# Patient Record
Sex: FEMALE | Race: OTHER | URBAN - METROPOLITAN AREA
[De-identification: names, ages, dates, MRNs, and addresses within clinical notes are randomized per-mention and may not be internally consistent; named-entity substitution may affect disease eponyms.]

---

## 2018-06-01 ENCOUNTER — INPATIENT (INPATIENT)
Facility: HOSPITAL | Age: 83
LOS: 0 days | Discharge: HOME HEALTH | DRG: 388 | End: 2018-06-02
Attending: INTERNAL MEDICINE | Admitting: INTERNAL MEDICINE
Payer: COMMERCIAL

## 2018-06-01 VITALS
HEIGHT: 63 IN | WEIGHT: 186.07 LBS | DIASTOLIC BLOOD PRESSURE: 75 MMHG | HEART RATE: 93 BPM | RESPIRATION RATE: 16 BRPM | TEMPERATURE: 99 F | SYSTOLIC BLOOD PRESSURE: 123 MMHG | OXYGEN SATURATION: 95 %

## 2018-06-01 LAB
ALBUMIN SERPL ELPH-MCNC: 4.1 G/DL — SIGNIFICANT CHANGE UP (ref 3.3–5)
ALP SERPL-CCNC: 90 U/L — SIGNIFICANT CHANGE UP (ref 40–120)
ALT FLD-CCNC: 6 U/L — LOW (ref 10–45)
ANION GAP SERPL CALC-SCNC: 14 MMOL/L — SIGNIFICANT CHANGE UP (ref 5–17)
APTT BLD: 27.8 SEC — SIGNIFICANT CHANGE UP (ref 27.5–37.4)
AST SERPL-CCNC: 24 U/L — SIGNIFICANT CHANGE UP (ref 10–40)
BASOPHILS NFR BLD AUTO: 0.2 % — SIGNIFICANT CHANGE UP (ref 0–2)
BILIRUB SERPL-MCNC: 0.6 MG/DL — SIGNIFICANT CHANGE UP (ref 0.2–1.2)
BUN SERPL-MCNC: 33 MG/DL — HIGH (ref 7–23)
CALCIUM SERPL-MCNC: 9.4 MG/DL — SIGNIFICANT CHANGE UP (ref 8.4–10.5)
CHLORIDE SERPL-SCNC: 103 MMOL/L — SIGNIFICANT CHANGE UP (ref 96–108)
CO2 SERPL-SCNC: 26 MMOL/L — SIGNIFICANT CHANGE UP (ref 22–31)
CREAT SERPL-MCNC: 0.7 MG/DL — SIGNIFICANT CHANGE UP (ref 0.5–1.3)
EOSINOPHIL NFR BLD AUTO: 0.1 % — SIGNIFICANT CHANGE UP (ref 0–6)
GLUCOSE SERPL-MCNC: 105 MG/DL — HIGH (ref 70–99)
HCT VFR BLD CALC: 42.4 % — SIGNIFICANT CHANGE UP (ref 34.5–45)
HGB BLD-MCNC: 13.6 G/DL — SIGNIFICANT CHANGE UP (ref 11.5–15.5)
INR BLD: 1.11 — SIGNIFICANT CHANGE UP (ref 0.88–1.16)
LACTATE SERPL-SCNC: 1.2 MMOL/L — SIGNIFICANT CHANGE UP (ref 0.5–2)
LYMPHOCYTES # BLD AUTO: 11.2 % — LOW (ref 13–44)
MAGNESIUM SERPL-MCNC: 2.2 MG/DL — SIGNIFICANT CHANGE UP (ref 1.6–2.6)
MCHC RBC-ENTMCNC: 29.1 PG — SIGNIFICANT CHANGE UP (ref 27–34)
MCHC RBC-ENTMCNC: 32.1 G/DL — SIGNIFICANT CHANGE UP (ref 32–36)
MCV RBC AUTO: 90.6 FL — SIGNIFICANT CHANGE UP (ref 80–100)
MONOCYTES NFR BLD AUTO: 5.8 % — SIGNIFICANT CHANGE UP (ref 2–14)
NEUTROPHILS NFR BLD AUTO: 82.7 % — HIGH (ref 43–77)
PHOSPHATE SERPL-MCNC: 4.4 MG/DL — SIGNIFICANT CHANGE UP (ref 2.5–4.5)
PLATELET # BLD AUTO: 227 K/UL — SIGNIFICANT CHANGE UP (ref 150–400)
POTASSIUM SERPL-MCNC: 4.4 MMOL/L — SIGNIFICANT CHANGE UP (ref 3.5–5.3)
POTASSIUM SERPL-SCNC: 4.4 MMOL/L — SIGNIFICANT CHANGE UP (ref 3.5–5.3)
PROT SERPL-MCNC: 6.6 G/DL — SIGNIFICANT CHANGE UP (ref 6–8.3)
PROTHROM AB SERPL-ACNC: 12.4 SEC — SIGNIFICANT CHANGE UP (ref 9.8–12.7)
RBC # BLD: 4.68 M/UL — SIGNIFICANT CHANGE UP (ref 3.8–5.2)
RBC # FLD: 15.5 % — SIGNIFICANT CHANGE UP (ref 10.3–16.9)
SODIUM SERPL-SCNC: 143 MMOL/L — SIGNIFICANT CHANGE UP (ref 135–145)
WBC # BLD: 16.4 K/UL — HIGH (ref 3.8–10.5)
WBC # FLD AUTO: 16.4 K/UL — HIGH (ref 3.8–10.5)

## 2018-06-01 PROCEDURE — 74019 RADEX ABDOMEN 2 VIEWS: CPT | Mod: 26

## 2018-06-01 PROCEDURE — 71045 X-RAY EXAM CHEST 1 VIEW: CPT | Mod: 26

## 2018-06-01 RX ORDER — SODIUM CHLORIDE 9 MG/ML
1000 INJECTION INTRAMUSCULAR; INTRAVENOUS; SUBCUTANEOUS
Qty: 0 | Refills: 0 | Status: DISCONTINUED | OUTPATIENT
Start: 2018-06-01 | End: 2018-06-02

## 2018-06-01 RX ADMIN — SODIUM CHLORIDE 90 MILLILITER(S): 9 INJECTION INTRAMUSCULAR; INTRAVENOUS; SUBCUTANEOUS at 23:24

## 2018-06-01 NOTE — H&P ADULT - NSHPPHYSICALEXAM_GEN_ALL_CORE
.  VITAL SIGNS:  T(C): 37.1 (06-01-18 @ 21:21), Max: 37.1 (06-01-18 @ 21:21)  T(F): 98.7 (06-01-18 @ 21:21), Max: 98.7 (06-01-18 @ 21:21)  HR: 93 (06-01-18 @ 21:21) (93 - 93)  BP: 123/75 (06-01-18 @ 21:21) (123/75 - 123/75)  BP(mean): --  RR: 16 (06-01-18 @ 21:21) (16 - 16)  SpO2: 95% (06-01-18 @ 21:21) (95% - 95%)  Wt(kg): --    PHYSICAL EXAM:    Constitutional: Frail, resting comfortably in bed; NAD  Head: NC/AT  Eyes: PERRL, EOMI, anicteric sclera  ENT: no nasal discharge; uvula midline, no oropharyngeal erythema or exudates; dry MM  Neck: supple; no JVD or thyromegaly  Respiratory: CTA B/L; no W/R/R, no retractions  Cardiac: +S1/S2; RRR; no M/R/G; PMI non-displaced  Gastrointestinal: soft, NT, distended, no rebound or guarding; +BSx4. Subcutaneous deep brain stimulator in place.  Extremities: WWP, no clubbing or cyanosis; no peripheral edema  Musculoskeletal: NROM x4; no joint swelling, tenderness or erythema  Vascular: 2+ radial, femoral, DP/PT pulses B/L  Dermatologic: skin warm, dry and intact; no rashes, wounds, or scars  Lymphatic: no submandibular or cervical LAD  Neurologic: AAOx2; CNII-XII grossly intact; no focal deficits  Psychiatric: affect and characteristics of appearance, verbalizations, behaviors are appropriate

## 2018-06-01 NOTE — PROGRESS NOTE ADULT - SUBJECTIVE AND OBJECTIVE BOX
Reviewed history with the family  Patient is awake and responsive Not fully oriented VS stable Afeb In RR Good BS No bowel sounds Abd is softly distended with slight RLQ tenderness

## 2018-06-01 NOTE — H&P ADULT - HISTORY OF PRESENT ILLNESS
83M w/hx of Parkinson's (w/an implanted nerve stimulator) who was transferred from [facility] with reported findings of partial gastric outlet obstruction and partial SBO. Per family at bedside, the patient has been having decreased PO intake for the last few weeks. This morning, the patient complained of abdominal pain to her home health aides (HHA) along with nausea and decreased pallor. Patient brought in to [place] ED where she had a CT A/P that showed partial gastric otulet obstruction and air fluid levels consistent with SBO. The staff there were unable to place a NGT; the patient reportedly vomited during attempted placement of the tube. The patient had a bowel movement in the ED before decision made to transfer patient to [facility] under Dr. Delgado. ROS (-) for recent illnesses, chest pain, palpitations, blood in stool, difficulty urinating, lower extremity swelling.     Upon arrival to the floor, VS - T: 98.7, HR: 93, BP: 123/73, R: 16, SpO2: 95% , 3LNC. 83M w/hx of Parkinson's (w/an implanted nerve stimulator) who was transferred from [facility] with reported findings of partial gastric outlet obstruction and partial SBO. Per family at bedside, the patient has been having decreased PO intake for the last few weeks. This morning, the patient complained of abdominal pain to her home health aides (HHA) along with nausea and decreased pallor. Patient brought in to [place] ED where she had a CT A/P that showed partial gastric otulet obstruction and air fluid levels consistent with SBO. The staff there were unable to place a NGT; the patient reportedly vomited during attempted placement of the tube. The patient had a bowel movement in the ED before decision made to transfer patient to [facility] under Dr. Delgado. ROS (-) for recent illnesses, chest pain, palpitations, blood in stool, difficulty urinating, lower extremity swelling.     Upon arrival to the floor, VS - T: 98.7, HR: 93, BP: 123/73, R: 16, SpO2: 95%, 3LNC. 83F w/hx of Parkinson's (w/an implanted nerve stimulator) who was transferred from [facility] with reported findings of partial gastric outlet obstruction and partial SBO. Per family at bedside, the patient has been having decreased PO intake for the last few weeks. This morning, the patient complained of abdominal pain to her home health aides (HHA) along with nausea and decreased pallor. Patient brought in to [place] ED where she had a CT A/P that showed partial gastric otulet obstruction and air fluid levels consistent with SBO. The staff there were unable to place a NGT; the patient reportedly vomited during attempted placement of the tube. The patient had a bowel movement in the ED before decision made to transfer patient to [facility] under Dr. Delgado. ROS (-) for recent illnesses, chest pain, palpitations, blood in stool, difficulty urinating, lower extremity swelling.     Upon arrival to the floor, VS - T: 98.7, HR: 93, BP: 123/73, R: 16, SpO2: 95%, 3LNC.

## 2018-06-01 NOTE — H&P ADULT - NSHPLABSRESULTS_GEN_ALL_CORE
.  LABS:                         13.6   16.4  )-----------( 227      ( 01 Jun 2018 23:06 )             42.4     06-01    143  |  103  |  33<H>  ----------------------------<  105<H>  4.4   |  26  |  0.70    Ca    9.4      01 Jun 2018 23:06  Phos  4.4     06-01  Mg     2.2     06-01    TPro  6.6  /  Alb  4.1  /  TBili  0.6  /  DBili  x   /  AST  24  /  ALT  6<L>  /  AlkPhos  90  06-01    PT/INR - ( 01 Jun 2018 23:06 )   PT: 12.4 sec;   INR: 1.11          PTT - ( 01 Jun 2018 23:06 )  PTT:27.8 sec          Lactate, Blood: 1.2 mmoL/L (06-01 @ 23:04)      RADIOLOGY, EKG & ADDITIONAL TESTS: Reviewed.

## 2018-06-01 NOTE — PROGRESS NOTE ADULT - ASSESSMENT
To treat conservatively tonight Will discuss with surgery  Discussed at length with the patients family who are at the bedside  They understand the critical nature of the problem given her severe comorbidities

## 2018-06-01 NOTE — H&P ADULT - PROBLEM SELECTOR PLAN 1
Patient presenting with abdominal pain, nausea with imaging at [facility] showing gastric outlet obstruction and partial small bowel obstruction. Patient having BMs and reports passing gas.  - NPO  - Surgery consult

## 2018-06-01 NOTE — H&P ADULT - ASSESSMENT
83F w/hx of Parkinson's (w/an implanted nerve stimulator) who was transferred from [facility] with reported findings of partial gastric outlet obstruction and partial SBO.

## 2018-06-02 VITALS
OXYGEN SATURATION: 93 % | RESPIRATION RATE: 18 BRPM | TEMPERATURE: 100 F | HEART RATE: 88 BPM | DIASTOLIC BLOOD PRESSURE: 69 MMHG | SYSTOLIC BLOOD PRESSURE: 122 MMHG

## 2018-06-02 DIAGNOSIS — Z96.89 PRESENCE OF OTHER SPECIFIED FUNCTIONAL IMPLANTS: Chronic | ICD-10-CM

## 2018-06-02 DIAGNOSIS — R63.8 OTHER SYMPTOMS AND SIGNS CONCERNING FOOD AND FLUID INTAKE: ICD-10-CM

## 2018-06-02 DIAGNOSIS — Z29.9 ENCOUNTER FOR PROPHYLACTIC MEASURES, UNSPECIFIED: ICD-10-CM

## 2018-06-02 DIAGNOSIS — G20 PARKINSON'S DISEASE: ICD-10-CM

## 2018-06-02 DIAGNOSIS — K56.609 UNSPECIFIED INTESTINAL OBSTRUCTION, UNSPECIFIED AS TO PARTIAL VERSUS COMPLETE OBSTRUCTION: ICD-10-CM

## 2018-06-02 LAB
ANION GAP SERPL CALC-SCNC: 13 MMOL/L — SIGNIFICANT CHANGE UP (ref 5–17)
ANION GAP SERPL CALC-SCNC: 16 MMOL/L — SIGNIFICANT CHANGE UP (ref 5–17)
BASOPHILS NFR BLD AUTO: 0.3 % — SIGNIFICANT CHANGE UP (ref 0–2)
BASOPHILS NFR BLD AUTO: 0.4 % — SIGNIFICANT CHANGE UP (ref 0–2)
BUN SERPL-MCNC: 26 MG/DL — HIGH (ref 7–23)
BUN SERPL-MCNC: 28 MG/DL — HIGH (ref 7–23)
C DIFF GDH STL QL: NEGATIVE — SIGNIFICANT CHANGE UP
C DIFF GDH STL QL: SIGNIFICANT CHANGE UP
CALCIUM SERPL-MCNC: 8.3 MG/DL — LOW (ref 8.4–10.5)
CALCIUM SERPL-MCNC: 8.4 MG/DL — SIGNIFICANT CHANGE UP (ref 8.4–10.5)
CHLORIDE SERPL-SCNC: 105 MMOL/L — SIGNIFICANT CHANGE UP (ref 96–108)
CHLORIDE SERPL-SCNC: 110 MMOL/L — HIGH (ref 96–108)
CO2 SERPL-SCNC: 20 MMOL/L — LOW (ref 22–31)
CO2 SERPL-SCNC: 23 MMOL/L — SIGNIFICANT CHANGE UP (ref 22–31)
CREAT SERPL-MCNC: 0.42 MG/DL — LOW (ref 0.5–1.3)
CREAT SERPL-MCNC: 0.48 MG/DL — LOW (ref 0.5–1.3)
EOSINOPHIL NFR BLD AUTO: 1.1 % — SIGNIFICANT CHANGE UP (ref 0–6)
EOSINOPHIL NFR BLD AUTO: 1.6 % — SIGNIFICANT CHANGE UP (ref 0–6)
GLUCOSE SERPL-MCNC: 145 MG/DL — HIGH (ref 70–99)
GLUCOSE SERPL-MCNC: 171 MG/DL — HIGH (ref 70–99)
HCT VFR BLD CALC: 37.2 % — SIGNIFICANT CHANGE UP (ref 34.5–45)
HCT VFR BLD CALC: 39.3 % — SIGNIFICANT CHANGE UP (ref 34.5–45)
HGB BLD-MCNC: 11.8 G/DL — SIGNIFICANT CHANGE UP (ref 11.5–15.5)
HGB BLD-MCNC: 12.3 G/DL — SIGNIFICANT CHANGE UP (ref 11.5–15.5)
LYMPHOCYTES # BLD AUTO: 17.8 % — SIGNIFICANT CHANGE UP (ref 13–44)
LYMPHOCYTES # BLD AUTO: 21.5 % — SIGNIFICANT CHANGE UP (ref 13–44)
MAGNESIUM SERPL-MCNC: 1.8 MG/DL — SIGNIFICANT CHANGE UP (ref 1.6–2.6)
MAGNESIUM SERPL-MCNC: 1.9 MG/DL — SIGNIFICANT CHANGE UP (ref 1.6–2.6)
MCHC RBC-ENTMCNC: 28.4 PG — SIGNIFICANT CHANGE UP (ref 27–34)
MCHC RBC-ENTMCNC: 28.7 PG — SIGNIFICANT CHANGE UP (ref 27–34)
MCHC RBC-ENTMCNC: 31.3 G/DL — LOW (ref 32–36)
MCHC RBC-ENTMCNC: 31.7 G/DL — LOW (ref 32–36)
MCV RBC AUTO: 90.5 FL — SIGNIFICANT CHANGE UP (ref 80–100)
MCV RBC AUTO: 90.8 FL — SIGNIFICANT CHANGE UP (ref 80–100)
MONOCYTES NFR BLD AUTO: 3.9 % — SIGNIFICANT CHANGE UP (ref 2–14)
MONOCYTES NFR BLD AUTO: 4.4 % — SIGNIFICANT CHANGE UP (ref 2–14)
NEUTROPHILS NFR BLD AUTO: 73.2 % — SIGNIFICANT CHANGE UP (ref 43–77)
NEUTROPHILS NFR BLD AUTO: 75.8 % — SIGNIFICANT CHANGE UP (ref 43–77)
PLATELET # BLD AUTO: 208 K/UL — SIGNIFICANT CHANGE UP (ref 150–400)
PLATELET # BLD AUTO: 210 K/UL — SIGNIFICANT CHANGE UP (ref 150–400)
POTASSIUM SERPL-MCNC: 3.7 MMOL/L — SIGNIFICANT CHANGE UP (ref 3.5–5.3)
POTASSIUM SERPL-MCNC: 4.1 MMOL/L — SIGNIFICANT CHANGE UP (ref 3.5–5.3)
POTASSIUM SERPL-SCNC: 3.7 MMOL/L — SIGNIFICANT CHANGE UP (ref 3.5–5.3)
POTASSIUM SERPL-SCNC: 4.1 MMOL/L — SIGNIFICANT CHANGE UP (ref 3.5–5.3)
RBC # BLD: 4.11 M/UL — SIGNIFICANT CHANGE UP (ref 3.8–5.2)
RBC # BLD: 4.33 M/UL — SIGNIFICANT CHANGE UP (ref 3.8–5.2)
RBC # FLD: 15 % — SIGNIFICANT CHANGE UP (ref 10.3–16.9)
RBC # FLD: 15.4 % — SIGNIFICANT CHANGE UP (ref 10.3–16.9)
SODIUM SERPL-SCNC: 141 MMOL/L — SIGNIFICANT CHANGE UP (ref 135–145)
SODIUM SERPL-SCNC: 146 MMOL/L — HIGH (ref 135–145)
TSH SERPL-MCNC: 0.63 UIU/ML — SIGNIFICANT CHANGE UP (ref 0.35–4.94)
WBC # BLD: 10.4 K/UL — SIGNIFICANT CHANGE UP (ref 3.8–10.5)
WBC # BLD: 11 K/UL — HIGH (ref 3.8–10.5)
WBC # FLD AUTO: 10.4 K/UL — SIGNIFICANT CHANGE UP (ref 3.8–10.5)
WBC # FLD AUTO: 11 K/UL — HIGH (ref 3.8–10.5)

## 2018-06-02 PROCEDURE — 97161 PT EVAL LOW COMPLEX 20 MIN: CPT

## 2018-06-02 PROCEDURE — 85610 PROTHROMBIN TIME: CPT

## 2018-06-02 PROCEDURE — 80048 BASIC METABOLIC PNL TOTAL CA: CPT

## 2018-06-02 PROCEDURE — 85025 COMPLETE CBC W/AUTO DIFF WBC: CPT

## 2018-06-02 PROCEDURE — 83605 ASSAY OF LACTIC ACID: CPT

## 2018-06-02 PROCEDURE — 85027 COMPLETE CBC AUTOMATED: CPT

## 2018-06-02 PROCEDURE — 83735 ASSAY OF MAGNESIUM: CPT

## 2018-06-02 PROCEDURE — 87324 CLOSTRIDIUM AG IA: CPT

## 2018-06-02 PROCEDURE — 36415 COLL VENOUS BLD VENIPUNCTURE: CPT

## 2018-06-02 PROCEDURE — 85730 THROMBOPLASTIN TIME PARTIAL: CPT

## 2018-06-02 PROCEDURE — 84100 ASSAY OF PHOSPHORUS: CPT

## 2018-06-02 PROCEDURE — 74019 RADEX ABDOMEN 2 VIEWS: CPT

## 2018-06-02 PROCEDURE — 71045 X-RAY EXAM CHEST 1 VIEW: CPT

## 2018-06-02 PROCEDURE — 84443 ASSAY THYROID STIM HORMONE: CPT

## 2018-06-02 PROCEDURE — 80053 COMPREHEN METABOLIC PANEL: CPT

## 2018-06-02 PROCEDURE — 87449 NOS EACH ORGANISM AG IA: CPT

## 2018-06-02 RX ORDER — CARBIDOPA AND LEVODOPA 25; 100 MG/1; MG/1
1.5 TABLET ORAL THREE TIMES A DAY
Qty: 0 | Refills: 0 | Status: DISCONTINUED | OUTPATIENT
Start: 2018-06-02 | End: 2018-06-02

## 2018-06-02 RX ORDER — HEPARIN SODIUM 5000 [USP'U]/ML
5000 INJECTION INTRAVENOUS; SUBCUTANEOUS EVERY 12 HOURS
Qty: 0 | Refills: 0 | Status: DISCONTINUED | OUTPATIENT
Start: 2018-06-02 | End: 2018-06-02

## 2018-06-02 RX ORDER — ESCITALOPRAM OXALATE 10 MG/1
15 TABLET, FILM COATED ORAL DAILY
Qty: 0 | Refills: 0 | Status: DISCONTINUED | OUTPATIENT
Start: 2018-06-02 | End: 2018-06-02

## 2018-06-02 RX ORDER — LANOLIN ALCOHOL/MO/W.PET/CERES
1 CREAM (GRAM) TOPICAL
Qty: 0 | Refills: 0 | COMMUNITY

## 2018-06-02 RX ORDER — DOCUSATE SODIUM 100 MG
1 CAPSULE ORAL
Qty: 0 | Refills: 0 | COMMUNITY

## 2018-06-02 RX ORDER — RIVASTIGMINE 4.6 MG/24H
1 PATCH, EXTENDED RELEASE TRANSDERMAL
Qty: 0 | Refills: 0 | COMMUNITY

## 2018-06-02 RX ORDER — CARBIDOPA AND LEVODOPA 25; 100 MG/1; MG/1
3 TABLET ORAL
Qty: 0 | Refills: 0 | COMMUNITY

## 2018-06-02 RX ORDER — SODIUM CHLORIDE 9 MG/ML
500 INJECTION, SOLUTION INTRAVENOUS
Qty: 0 | Refills: 0 | Status: DISCONTINUED | OUTPATIENT
Start: 2018-06-02 | End: 2018-06-02

## 2018-06-02 RX ORDER — DOCUSATE SODIUM 100 MG
100 CAPSULE ORAL
Qty: 0 | Refills: 0 | Status: DISCONTINUED | OUTPATIENT
Start: 2018-06-02 | End: 2018-06-02

## 2018-06-02 RX ORDER — CARBIDOPA AND LEVODOPA 25; 100 MG/1; MG/1
3 TABLET ORAL
Qty: 0 | Refills: 0 | Status: DISCONTINUED | OUTPATIENT
Start: 2018-06-02 | End: 2018-06-02

## 2018-06-02 RX ORDER — CARBIDOPA AND LEVODOPA 25; 100 MG/1; MG/1
1 TABLET ORAL
Qty: 0 | Refills: 0 | COMMUNITY

## 2018-06-02 RX ORDER — CLOZAPINE 150 MG/1
31.25 TABLET, ORALLY DISINTEGRATING ORAL DAILY
Qty: 0 | Refills: 0 | Status: DISCONTINUED | OUTPATIENT
Start: 2018-06-02 | End: 2018-06-02

## 2018-06-02 RX ORDER — ESCITALOPRAM OXALATE 10 MG/1
15 TABLET, FILM COATED ORAL
Qty: 0 | Refills: 0 | COMMUNITY

## 2018-06-02 RX ORDER — SODIUM CHLORIDE 9 MG/ML
1000 INJECTION, SOLUTION INTRAVENOUS
Qty: 0 | Refills: 0 | Status: DISCONTINUED | OUTPATIENT
Start: 2018-06-02 | End: 2018-06-02

## 2018-06-02 RX ORDER — CLOZAPINE 150 MG/1
31.25 TABLET, ORALLY DISINTEGRATING ORAL
Qty: 0 | Refills: 0 | COMMUNITY

## 2018-06-02 RX ORDER — RIVASTIGMINE 4.6 MG/24H
1 PATCH, EXTENDED RELEASE TRANSDERMAL EVERY 24 HOURS
Qty: 0 | Refills: 0 | Status: DISCONTINUED | OUTPATIENT
Start: 2018-06-02 | End: 2018-06-02

## 2018-06-02 RX ADMIN — HEPARIN SODIUM 5000 UNIT(S): 5000 INJECTION INTRAVENOUS; SUBCUTANEOUS at 16:59

## 2018-06-02 RX ADMIN — SODIUM CHLORIDE 250 MILLILITER(S): 9 INJECTION, SOLUTION INTRAVENOUS at 12:08

## 2018-06-02 RX ADMIN — RIVASTIGMINE 1 PATCH: 4.6 PATCH, EXTENDED RELEASE TRANSDERMAL at 10:24

## 2018-06-02 RX ADMIN — CARBIDOPA AND LEVODOPA 3 CAPSULE(S): 25; 100 TABLET ORAL at 14:43

## 2018-06-02 RX ADMIN — CARBIDOPA AND LEVODOPA 3 CAPSULE(S): 25; 100 TABLET ORAL at 18:34

## 2018-06-02 RX ADMIN — CARBIDOPA AND LEVODOPA 3 CAPSULE(S): 25; 100 TABLET ORAL at 10:54

## 2018-06-02 RX ADMIN — HEPARIN SODIUM 5000 UNIT(S): 5000 INJECTION INTRAVENOUS; SUBCUTANEOUS at 05:59

## 2018-06-02 NOTE — PHYSICAL THERAPY INITIAL EVALUATION ADULT - ADDITIONAL COMMENTS
Pt has ~12 steps to negotiate, is without use of DME as she holds on to HHA. Has hx of falls however unable to obtain information from pt or son regarding situations.

## 2018-06-02 NOTE — DIETITIAN INITIAL EVALUATION ADULT. - ENERGY NEEDS
Height: 5'3" Weight: 84lbs, IBW 115lbs+/-10%, %IBW 73%, BMI 14.9  IBW used for calculations as pt <80% of IBW    Nutrient needs based on [facility] standards of care for maintenance in older adults.  Needs adjusted for age, unintentional wt loss, severe muscle wasting, stage II pressure ulcer

## 2018-06-02 NOTE — DIETITIAN INITIAL EVALUATION ADULT. - PROBLEM SELECTOR PLAN 1
Patient presenting with abdominal pain, nausea with imaging at [facility] showing gastric outlet obstruction and partial small bowel obstruction. Patient having BMs and reports passing gas.  - NPO  - Surgery consult

## 2018-06-02 NOTE — PHYSICAL THERAPY INITIAL EVALUATION ADULT - CRITERIA FOR SKILLED THERAPEUTIC INTERVENTIONS
risk reduction/prevention/impairments found/rehab potential/therapy frequency/anticipated discharge recommendation/functional limitations in following categories

## 2018-06-02 NOTE — DISCHARGE NOTE ADULT - CARE PROVIDER_API CALL
Pratik Delgado), Internal Medicine  [address] [place], [place] [address]  Phone: [phone]  Fax: [phone]

## 2018-06-02 NOTE — CHART NOTE - NSCHARTNOTEFT_GEN_A_CORE
Upon Nutritional Assessment by the Registered Dietitian your patient was determined to meet criteria / has evidence of the following diagnosis/diagnoses:          [ ]  Mild Protein Calorie Malnutrition        [ ]  Moderate Protein Calorie Malnutrition        [x ] Severe Protein Calorie Malnutrition        [ ] Unspecified Protein Calorie Malnutrition        [x ] Underweight / BMI <19        [ ] Morbid Obesity / BMI > 40      Findings as based on:  •  Comprehensive nutrition assessment and consultation  •  Calorie counts (nutrient intake analysis)  •  Food acceptance and intake status from observations by staff  •  Follow up  •  Patient education  •  Intervention secondary to interdisciplinary rounds  •   concerns    BMI 14.9. 73% of ideal body weight.  Pt has had a 10lb wt loss over the last 8 weeks. UBW 195lbs, current admitted wt 84lbs.   Catabolic state evidenced by muscle wasting in the temporalis and clavicular region and stage II pressure ulcer    Treatment:    The following diet has been recommended:  1) Advance diet to Low fiber once medically feasible  2) EnsureEnlive TID (1050kcal, 60g pro)  3) MVI daily  recs discussed w/ team.    PROVIDER Section:     By signing this assessment you are acknowledging and agree with the diagnosis/diagnoses assigned by the Registered Dietitian    Comments:

## 2018-06-02 NOTE — DISCHARGE NOTE ADULT - HOSPITAL COURSE
84 y/o F with PMH of parkinson's dz (with implanted abdominal nerve stimulator), depression (  5 mos ago) who was transferred from [facility] (went there same day) with reported findings of partial gastric outlet obstruction and partial SBO on a CT abdomen/pelvis. The staff there were unable to place a NGT; the patient reportedly vomited during attempted placement of the tube. The patient had a bowel movement in the ED before decision made to transfer patient to [facility] under Dr. Delgado. Overnight at [facility], patient began to produce loose and watery stool - rectal tube placed, and c. diff was sent (was negative). Surgery recommended to discontinue NPO and advance to clears. Patient tolerated clears and full liquid diet. Leukocytosis resolved. Briefly hypernatremic to 146, given 500cc D5W, Na went down to 141 (resolved). Home PT set up with . Pt stable for discharge home where she already has 24 hour private home care.

## 2018-06-02 NOTE — PHYSICAL THERAPY INITIAL EVALUATION ADULT - GAIT DEVIATIONS NOTED, PT EVAL
decreased stride length/decreased weight-shifting ability/decreased myriam/increased time in double stance

## 2018-06-02 NOTE — CONSULT NOTE ADULT - SUBJECTIVE AND OBJECTIVE BOX
HPI:  83M w/hx of Parkinson's (w/an implanted nerve stimulator) who was transferred from [facility] with reported findings of partial gastric outlet obstruction and partial SBO. Per family at bedside, the patient has been having decreased PO intake for the last few weeks. This morning, the patient complained of abdominal pain to her home health aides (HHA) along with nausea and decreased pallor. Patient brought in to [place] ED where she had a CT A/P that showed partial gastric otulet obstruction and air fluid levels consistent with SBO. The staff there were unable to place a NGT; the patient reportedly vomited during attempted placement of the tube. The patient had a bowel movement in the ED before decision made to transfer patient to [facility] under Dr. Delgado. ROS (-) for recent illnesses, chest pain, palpitations, blood in stool, difficulty urinating, lower extremity swelling.     Upon arrival to the floor, VS - T: 98.7, HR: 93, BP: 123/73, R: 16, SpO2: 95%, 3LNC. (01 Jun 2018 22:04)    SURGERY ADDENDUM:  History obtained from family and patient  Agree with above HPI. Patient seen and examined at the bedside, remains afebrile/hemodynamically stable, reports passing flatus, had 1 BM in the ED in [place] and one BM in [facility]. Denies nausea, emesis, fevers, chills, urinary symptoms. Reports feeling weak      PAST MEDICAL & SURGICAL HISTORY:  Parkinson disease  S/P deep brain stimulator placement      ROS: See HPI    MEDICATIONS:  ALLERGIES:    SOCIAL HISTORY:  Smoke: Never Smoker  EtOH: occasional    Vital Signs Last 24 Hrs  T(C): 37.1 (01 Jun 2018 21:21), Max: 37.1 (01 Jun 2018 21:21)  T(F): 98.7 (01 Jun 2018 21:21), Max: 98.7 (01 Jun 2018 21:21)  HR: 93 (01 Jun 2018 21:21) (93 - 93)  BP: 123/75 (01 Jun 2018 21:21) (123/75 - 123/75)  BP(mean): --  RR: 16 (01 Jun 2018 21:21) (16 - 16)  SpO2: 95% (01 Jun 2018 21:21) (95% - 95%)    PHYSICAL EXAM    Gen: NAD, resting in bed comfortably  CV: RRR, normotensive  Pulm: CTAB, non-labored breathing on RA  Abd: Soft, distended mostly in lower abdomen, non tender, no rebound or guarding, palpable and visible LLQ brain stimulator placed subcutaneously  Extremities: WWP    LABS:                        13.6   16.4  )-----------( 227      ( 01 Jun 2018 23:06 )             42.4     06-01    143  |  103  |  33<H>  ----------------------------<  105<H>  4.4   |  26  |  0.70    Ca    9.4      01 Jun 2018 23:06  Phos  4.4     06-01  Mg     2.2     06-01    TPro  6.6  /  Alb  4.1  /  TBili  0.6  /  DBili  x   /  AST  24  /  ALT  6<L>  /  AlkPhos  90  06-01    PT/INR - ( 01 Jun 2018 23:06 )   PT: 12.4 sec;   INR: 1.11          PTT - ( 01 Jun 2018 23:06 )  PTT:27.8 sec      RADIOLOGY & ADDITIONAL STUDIES:      ASSESSMENT AND PLAN:  Gen surg (Dr. Garcia) - 84 yo female with hx of Parkinson disease, here with 1 day of weakness, pallor and nausea, transferred from [place] with CT concerning for possible gastric outlet obstruction, partial SBO ? (CT performed in OSH, unable to review)   - unable to find CT (will follow up in the morning)    - abdominal xray with slightly distended loops of bowel, although clinically patient has BMs and +flatus - no need for NGT at this point   - recommend GI consult to evaluate for possible esophageal dysmotility/hiatal hernia or other cause of possible gastric outlet obstruction   - abdominal exam per surgery   - NPO, NGT if nausea/emesis   - IVF hydration, lytes repletion as needed   - continue care per medicine team   - surgery following, please call if any issues  -  discussed with chief resident and Dr. Garcia

## 2018-06-02 NOTE — PROGRESS NOTE ADULT - SUBJECTIVE AND OBJECTIVE BOX
Much better this am Had multiple BMs overnight No pain VS stable Afeb In RR Abd sless distended and nontender

## 2018-06-02 NOTE — PROGRESS NOTE ADULT - ASSESSMENT
[age] yo female with hx of Parkinson disease, here with 1 day of weakness, pallor and nausea, transferred from [place] with CT concerning for possible gastric outlet obstruction, partial SBO ? (CT performed in OSH, unable to review).    Having multiple BM today  May have CLD  Pain and nausea control  cont home meds   OOB/IS  Will review the CT once its uploaded  team 4 will follow 8 3 yo female with hx of Parkinson disease, here with 1 day of weakness, pallor and nausea, transferred from [place] with CT concerning for possible gastric outlet obstruction, partial SBO ? (CT performed in OSH, unable to review).    Having multiple BM today  May have regular diet  Pain and nausea control  cont home meds   OOB/IS  Will review the CT once its uploaded  team 4 will follow

## 2018-06-02 NOTE — DISCHARGE NOTE ADULT - CARE PLAN
Principal Discharge DX:	SBO (small bowel obstruction)  Goal:	Discharge home with home care and home physical therapy.  Assessment and plan of treatment:	You had a partial intestinal obstruction which resolved on its own. Please follow up with Dr. Delgado.  Secondary Diagnosis:	Parkinson disease  Assessment and plan of treatment:	Please continue home medications for parkinson's disease and depression.

## 2018-06-02 NOTE — DISCHARGE NOTE ADULT - MEDICATION SUMMARY - MEDICATIONS TO TAKE
I will START or STAY ON the medications listed below when I get home from the hospital:    escitalopram  -- 15 milligram(s) by mouth once a day  -- Indication: For Depression    Rytary 36.25 mg-145 mg oral capsule, extended release  -- 3 cap(s) by mouth 4 times a day  -- Indication: For Parkinson disease    cloZAPine  -- 31.25 milligram(s) by mouth once a day  -- Indication: For Depression    rivastigmine 4.6 mg/24 hr transdermal film, extended release  -- 1 patch by transdermal patch once a day  -- Indication: For Parkinson disease    Colace 100 mg oral capsule  -- 1 cap(s) by mouth 2 times a day  -- Indication: For Constipation    Melatonin 3 mg oral tablet  -- 1 tab(s) by mouth once (at bedtime)  -- Indication: For Insomnia

## 2018-06-02 NOTE — DIETITIAN INITIAL EVALUATION ADULT. - OTHER INFO
84yo F w/ hx of Parkinson disease, admitted w/ 1 day of weakness, pallor and nausea, transferred from [place] with CT concerning for possible gastric outlet obstruction, partial SBO. Pt currently on a CLD and tolerating PO. Per son at bedside, pt consumed >75% EnsureClear. No N/V at present. Per MD, pt had multiple loose BMs overnight. Pt has had a 10lb wt loss over last 2 months. UBW 95lbs, current wt 94lbs (10.5% wt change in 2 months). Pt appears to have severe muscle wasting in temporalis and clavicular region. Per son, pt generally does not have mechanical issues however occasionally requires altered consistencies w/ fluctuation of parkinson's symptoms. Generally eats soft foods like salmon/fish and soups. NKFA or dietary restrictions. Skin and GI WLD per flowsheet. Skin: yeny score 14, stage ll pressurce ulcer; GI: soft, brown/black stool, fecal incontinence per flowsheet.

## 2018-06-02 NOTE — PHYSICAL THERAPY INITIAL EVALUATION ADULT - PERTINENT HX OF CURRENT PROBLEM, REHAB EVAL
83 year old female. Per family at bedside, the patient has been having decreased PO intake for the last few weeks. This morning, the patient complained of abdominal pain to her home health aides (HHA) along with nausea and decreased pallor.

## 2018-06-02 NOTE — PHYSICAL THERAPY INITIAL EVALUATION ADULT - FOLLOWS COMMANDS/ANSWERS QUESTIONS, REHAB EVAL
75% of the time/able to follow single-step instructions/Pt follows commands, however with delays or need for repeated verbalizations; pt unable to give accurate account of most information as timing is off and story slightly off but generally correct about events as per son.

## 2018-06-02 NOTE — PROGRESS NOTE ADULT - SUBJECTIVE AND OBJECTIVE BOX
SUBJECTIVE: Patient seen and examined bedside by chief resident. Having BM, abd less distended, non tender, no nausea/vomiting    heparin  Injectable 5000 Unit(s) SubCutaneous every 12 hours      Vital Signs Last 24 Hrs  T(C): 36.7 (02 Jun 2018 05:30), Max: 37.1 (01 Jun 2018 21:21)  T(F): 98 (02 Jun 2018 05:30), Max: 98.7 (01 Jun 2018 21:21)  HR: 77 (02 Jun 2018 05:30) (77 - 93)  BP: 131/75 (02 Jun 2018 05:30) (123/75 - 131/75)  BP(mean): --  RR: 18 (02 Jun 2018 05:30) (16 - 18)  SpO2: 96% (02 Jun 2018 05:30) (95% - 96%)  I&O's Detail    01 Jun 2018 07:01  -  02 Jun 2018 07:00  --------------------------------------------------------  IN:    sodium chloride 0.9%.: 900 mL  Total IN: 900 mL    OUT:  Total OUT: 0 mL    Total NET: 900 mL          General: NAD, resting comfortably in bed  C/V: NSR  Pulm: Nonlabored breathing, no respiratory distress  Abd: soft, NT/ND.  Extrem: WWP, no edema, SCDs in place        LABS:                        13.6   16.4  )-----------( 227      ( 01 Jun 2018 23:06 )             42.4     06-01    143  |  103  |  33<H>  ----------------------------<  105<H>  4.4   |  26  |  0.70    Ca    9.4      01 Jun 2018 23:06  Phos  4.4     06-01  Mg     2.2     06-01    TPro  6.6  /  Alb  4.1  /  TBili  0.6  /  DBili  x   /  AST  24  /  ALT  6<L>  /  AlkPhos  90  06-01    PT/INR - ( 01 Jun 2018 23:06 )   PT: 12.4 sec;   INR: 1.11          PTT - ( 01 Jun 2018 23:06 )  PTT:27.8 sec      RADIOLOGY & ADDITIONAL STUDIES:

## 2018-06-02 NOTE — DISCHARGE NOTE ADULT - PATIENT PORTAL LINK FT
You can access the [facility] Patient Portal, offered by [facility], by registering with the following website: [email or web address]

## 2018-06-02 NOTE — DISCHARGE NOTE ADULT - PLAN OF CARE
Discharge home with home care and home physical therapy. You had a partial intestinal obstruction which resolved on its own. Please follow up with Dr. Delgado. Please continue home medications for parkinson's disease and depression.

## 2018-06-05 DIAGNOSIS — D72.829 ELEVATED WHITE BLOOD CELL COUNT, UNSPECIFIED: ICD-10-CM

## 2018-06-05 DIAGNOSIS — K56.600 PARTIAL INTESTINAL OBSTRUCTION, UNSPECIFIED AS TO CAUSE: ICD-10-CM

## 2018-06-05 DIAGNOSIS — G20 PARKINSON'S DISEASE: ICD-10-CM

## 2018-06-05 DIAGNOSIS — L89.522 PRESSURE ULCER OF LEFT ANKLE, STAGE 2: ICD-10-CM

## 2018-06-05 DIAGNOSIS — F32.9 MAJOR DEPRESSIVE DISORDER, SINGLE EPISODE, UNSPECIFIED: ICD-10-CM

## 2018-06-05 DIAGNOSIS — E43 UNSPECIFIED SEVERE PROTEIN-CALORIE MALNUTRITION: ICD-10-CM

## 2018-06-05 DIAGNOSIS — Z96.89 PRESENCE OF OTHER SPECIFIED FUNCTIONAL IMPLANTS: ICD-10-CM

## 2018-06-05 DIAGNOSIS — E87.0 HYPEROSMOLALITY AND HYPERNATREMIA: ICD-10-CM

## 2023-01-01 NOTE — PHYSICAL THERAPY INITIAL EVALUATION ADULT - LEVEL OF INDEPENDENCE: SCOOT/BRIDGE, REHAB EVAL
Screen#: [id number]  Screen Date: 2023  Screen Comment: N/A    Screen#: [id number]  Screen Date: 2023  Screen Comment: N/A    
minimum assist (75% patients effort)

## 2023-02-02 NOTE — PATIENT PROFILE ADULT. - PAIN CHRONIC, PROFILE
Caller: Mouna Manley    Relationship: Self    Best call back number: [phone]    What is the best time to reach you: ANYTIME    Who are you requesting to speak with (clinical staff, provider,  specific staff member):CLINICAL     What was the call regarding: PATIENT CALLING STATING THAT SHE WENT TO THE [facility] YESTERDAY DUE TO THE SYMPTOMS GETTING WORSE SHE STATED SHE WAS DIAGNOSED WITH A UTI AND CHLAMYDIA THE PROVIDER SHE SEEN WAS NOT COVER UNDER MEDICAID SHE IS NEEDING THE PRESCRIPTIONS RESENT TO THE PHARMACY BY KATHRYN SAWANT.SHE IS UNSURE OF THE NAME OF THE MEDICATION.    Do you require a callback: YES           no

## 2024-08-14 NOTE — DISCHARGE NOTE ADULT - IF YOU ARE A SMOKER, IT IS IMPORTANT FOR YOUR HEALTH TO STOP SMOKING. PLEASE BE AWARE THAT SECOND HAND SMOKE IS ALSO HARMFUL.
Follow up on hypothyroidism.     Pt is a 62 year old woman who had been dx with hypothyroidism about 2015.     She has been on levothyroxine since dx. She has been on multiple doses of levothyroxine due to fluctuating TSH levels.   It was changed to brand name Synthroid May 2022 with more consistency in lab results/dosing.   Pt reports more energy since change.     TSH relatively stable. Did have a low TSH on 5/22/24, however, was ill at that time with a reported ear infection. Dosing not changed and TSH normalized in 7/23/24.  On brand name Synthroid 100 mcq daily. Reports symptoms of fatigue, cold intolerance, dry skin, waxing/waning constipation vs diarrhea that is chronic in nature. Symptoms not new and not any worse than usual.     Takes synthroid daily, in the morning, on an empty stomach, no other medications for 1 hour.     TSH receptor Ab negative.   Tg Ab 4.5 (ref range 0-4.0 international units/mL)  TPO Ab 5,476 (ref range <60 units/mL)    HISTORY  She reports no exposure/intake: Amiodarone, Lithium, herbal medicinals and iodinated supplements, kelp, biotin.  Sister with thyroid disease.   Father passed away when pt was 11 yo therefore she does not know his family history.   No family h/o thyroid cancer.   No h/o childhood neck radiation.    Lab Review  TSH (mcUnits/mL)   Date Value   07/23/2024 2.867     T4, Free (ng/dL)   Date Value   02/10/2023 1.1     FT4 by ED 1.7 on 12/20/2022.     Imaging: No imaging pertinent for the patient.    HISTORY  Patient histories have been reviewed in the electronic medical record.   GM, mother with DM.   Extensive CV disease, hypertension.   Sister with thyroid disease.   Allergies and medications reviewed.   Father passed away when she is young so family history from fathers side limited.    REVIEW OF SYSTEMS  7 point ROS obtained and positives as noted:     PHYSICAL EXAM  Visit Vitals  /76   Pulse (!) 58   Ht 5' 4\" (1.626 m)   Wt 73.5 kg (162 lb 0.6 oz)   BMI  27.81 kg/m²     Constitutional: Resting, normal appearance, well nourished.   Psychiatric: Alert and oriented x3 and normal mood and affect.  Eyes: clear conjunctiva, no icterus.  Resp: Non-labored. CTA throughout.  Cardiac: HR RRR. No lower leg edema.  GI: Non distended, soft appearing.   Skin: No rashes or ulcerations noted  Neurologic: no tremors in hands noted.     ASSESSMENT  Hashimoto thyroid disease  Fluctuating TSH levels  Family history of thyroid disease (sister).   Vitamin D deficiency    PLAN  TSH had been fluctuating, but has seemingly stabilized. Fatigue not likely from thyroid, so other causes should be explored by PCP.  - Synthroid 100 mcg daily, brand medically necessary  - repeat TSH Prior to next appointment in 02/2025  - Vit D normal on 5/22/24. Continue taking Vit D 2000IU daily. Recheck next spring.        RTC in 6 months.     PRAMOD Smith     Statement Selected